# Patient Record
Sex: MALE | Race: WHITE | NOT HISPANIC OR LATINO | Employment: FULL TIME | URBAN - METROPOLITAN AREA
[De-identification: names, ages, dates, MRNs, and addresses within clinical notes are randomized per-mention and may not be internally consistent; named-entity substitution may affect disease eponyms.]

---

## 2024-04-11 ENCOUNTER — HOSPITAL ENCOUNTER (EMERGENCY)
Facility: CLINIC | Age: 44
Discharge: HOME | End: 2024-04-12
Attending: EMERGENCY MEDICINE | Admitting: EMERGENCY MEDICINE
Payer: COMMERCIAL

## 2024-04-11 ENCOUNTER — APPOINTMENT (OUTPATIENT)
Dept: CT IMAGING | Facility: CLINIC | Age: 44
End: 2024-04-11
Attending: EMERGENCY MEDICINE
Payer: COMMERCIAL

## 2024-04-11 VITALS
TEMPERATURE: 98.2 F | BODY MASS INDEX: 21.97 KG/M2 | DIASTOLIC BLOOD PRESSURE: 87 MMHG | SYSTOLIC BLOOD PRESSURE: 140 MMHG | WEIGHT: 140 LBS | RESPIRATION RATE: 16 BRPM | OXYGEN SATURATION: 95 % | HEART RATE: 77 BPM | HEIGHT: 67 IN

## 2024-04-11 DIAGNOSIS — M54.2 NECK PAIN ON LEFT SIDE: ICD-10-CM

## 2024-04-11 DIAGNOSIS — R93.89 ABNORMAL CT SCAN: ICD-10-CM

## 2024-04-11 LAB
ANION GAP SERPL CALCULATED.3IONS-SCNC: 8 MMOL/L (ref 7–15)
BASOPHILS # BLD AUTO: 0.1 10E3/UL (ref 0–0.2)
BASOPHILS NFR BLD AUTO: 1 %
BUN SERPL-MCNC: 17 MG/DL (ref 6–20)
CALCIUM SERPL-MCNC: 9.9 MG/DL (ref 8.6–10)
CHLORIDE SERPL-SCNC: 105 MMOL/L (ref 98–107)
CREAT SERPL-MCNC: 0.73 MG/DL (ref 0.67–1.17)
CRP SERPL-MCNC: 6.38 MG/L
DEPRECATED HCO3 PLAS-SCNC: 27 MMOL/L (ref 22–29)
EGFRCR SERPLBLD CKD-EPI 2021: >90 ML/MIN/1.73M2
EOSINOPHIL # BLD AUTO: 0.4 10E3/UL (ref 0–0.7)
EOSINOPHIL NFR BLD AUTO: 4 %
ERYTHROCYTE [DISTWIDTH] IN BLOOD BY AUTOMATED COUNT: 13.5 % (ref 10–15)
GLUCOSE SERPL-MCNC: 98 MG/DL (ref 70–99)
HCT VFR BLD AUTO: 42.2 % (ref 40–53)
HGB BLD-MCNC: 14.5 G/DL (ref 13.3–17.7)
IMM GRANULOCYTES # BLD: 0.1 10E3/UL
IMM GRANULOCYTES NFR BLD: 1 %
LYMPHOCYTES # BLD AUTO: 2.6 10E3/UL (ref 0.8–5.3)
LYMPHOCYTES NFR BLD AUTO: 31 %
MCH RBC QN AUTO: 30.4 PG (ref 26.5–33)
MCHC RBC AUTO-ENTMCNC: 34.4 G/DL (ref 31.5–36.5)
MCV RBC AUTO: 89 FL (ref 78–100)
MONOCYTES # BLD AUTO: 0.9 10E3/UL (ref 0–1.3)
MONOCYTES NFR BLD AUTO: 10 %
NEUTROPHILS # BLD AUTO: 4.5 10E3/UL (ref 1.6–8.3)
NEUTROPHILS NFR BLD AUTO: 53 %
NRBC # BLD AUTO: 0 10E3/UL
NRBC BLD AUTO-RTO: 0 /100
PLATELET # BLD AUTO: 273 10E3/UL (ref 150–450)
POTASSIUM SERPL-SCNC: 3.9 MMOL/L (ref 3.4–5.3)
RBC # BLD AUTO: 4.77 10E6/UL (ref 4.4–5.9)
SODIUM SERPL-SCNC: 140 MMOL/L (ref 135–145)
WBC # BLD AUTO: 8.4 10E3/UL (ref 4–11)

## 2024-04-11 PROCEDURE — 86140 C-REACTIVE PROTEIN: CPT | Performed by: EMERGENCY MEDICINE

## 2024-04-11 PROCEDURE — 250N000009 HC RX 250: Performed by: EMERGENCY MEDICINE

## 2024-04-11 PROCEDURE — 70491 CT SOFT TISSUE NECK W/DYE: CPT

## 2024-04-11 PROCEDURE — 250N000011 HC RX IP 250 OP 636: Performed by: EMERGENCY MEDICINE

## 2024-04-11 PROCEDURE — 99284 EMERGENCY DEPT VISIT MOD MDM: CPT | Performed by: EMERGENCY MEDICINE

## 2024-04-11 PROCEDURE — 85049 AUTOMATED PLATELET COUNT: CPT | Performed by: EMERGENCY MEDICINE

## 2024-04-11 PROCEDURE — 96374 THER/PROPH/DIAG INJ IV PUSH: CPT | Mod: 59 | Performed by: EMERGENCY MEDICINE

## 2024-04-11 PROCEDURE — 36415 COLL VENOUS BLD VENIPUNCTURE: CPT | Performed by: EMERGENCY MEDICINE

## 2024-04-11 PROCEDURE — 82374 ASSAY BLOOD CARBON DIOXIDE: CPT | Performed by: EMERGENCY MEDICINE

## 2024-04-11 PROCEDURE — 99285 EMERGENCY DEPT VISIT HI MDM: CPT | Mod: 25 | Performed by: EMERGENCY MEDICINE

## 2024-04-11 RX ORDER — IOPAMIDOL 755 MG/ML
90 INJECTION, SOLUTION INTRAVASCULAR ONCE
Status: COMPLETED | OUTPATIENT
Start: 2024-04-11 | End: 2024-04-11

## 2024-04-11 RX ORDER — DEXAMETHASONE 4 MG/1
6 TABLET ORAL 2 TIMES DAILY WITH MEALS
Qty: 9 TABLET | Refills: 0 | Status: SHIPPED | OUTPATIENT
Start: 2024-04-11 | End: 2024-04-14

## 2024-04-11 RX ORDER — KETOROLAC TROMETHAMINE 15 MG/ML
15 INJECTION, SOLUTION INTRAMUSCULAR; INTRAVENOUS ONCE
Status: COMPLETED | OUTPATIENT
Start: 2024-04-11 | End: 2024-04-11

## 2024-04-11 RX ADMIN — IOPAMIDOL 90 ML: 755 INJECTION, SOLUTION INTRAVENOUS at 10:44

## 2024-04-11 RX ADMIN — SODIUM CHLORIDE 80 ML: 9 INJECTION, SOLUTION INTRAVENOUS at 10:44

## 2024-04-11 RX ADMIN — KETOROLAC TROMETHAMINE 15 MG: 15 INJECTION, SOLUTION INTRAMUSCULAR; INTRAVENOUS at 10:40

## 2024-04-11 ASSESSMENT — ACTIVITIES OF DAILY LIVING (ADL)
ADLS_ACUITY_SCORE: 35

## 2024-04-11 ASSESSMENT — COLUMBIA-SUICIDE SEVERITY RATING SCALE - C-SSRS
2. HAVE YOU ACTUALLY HAD ANY THOUGHTS OF KILLING YOURSELF IN THE PAST MONTH?: NO
6. HAVE YOU EVER DONE ANYTHING, STARTED TO DO ANYTHING, OR PREPARED TO DO ANYTHING TO END YOUR LIFE?: NO
1. IN THE PAST MONTH, HAVE YOU WISHED YOU WERE DEAD OR WISHED YOU COULD GO TO SLEEP AND NOT WAKE UP?: NO

## 2024-04-11 NOTE — ED TRIAGE NOTES
#19 Root canal on Monday, pt has swelling in left jaw, went to dentist today and they stated he needed to come to ED for IV antibiotics.      Triage Assessment (Adult)       Row Name 04/11/24 1014          Triage Assessment    Airway WDL WDL        Respiratory WDL    Respiratory WDL WDL        Skin Circulation/Temperature WDL    Skin Circulation/Temperature WDL WDL        Cardiac WDL    Cardiac WDL WDL        Peripheral/Neurovascular WDL    Peripheral Neurovascular WDL WDL

## 2024-04-11 NOTE — DISCHARGE INSTRUCTIONS
CT scan of your neck did not show any abscess or signs of infection. I sent a prescription for a steroid to help with your swelling. No signs of infection at this point. Will give you antibiotics in case this is a developing infection and I would like you to follow up with your dentist. There was an abnormality in the right side of your brain which were only partially visualized on CT scan. You will need a MRI of your brain to look at this further. I understand that you can not wait here today to have this done and an order for outpatient test was ordered. You will need to call [phone] to schedule. You will not be contacted with results. You will need to follow up on results thru MyChart or with your primary care provider.     If you develop fever greater than 100.4, trouble swallowing, trouble opening your mouth, drooling, uncontrollable pain, or other new symptoms that you find concerning, you should return to the ED right away.

## 2024-04-11 NOTE — ED PROVIDER NOTES
"  History     Chief Complaint   Patient presents with    Facial Swelling     Root canal on Monday, pt has swelling in left jaw, went to dentist today and they stated he needed to come to ED for IV antibiotics.      HPI  Jose Dugan is a 43 year old male who presents with pain and swelling under left side of jaw and dysphagia in context of root canal and tooth 19 3 days previous.  He says he followed up with his dentist today and was told that he should come to the emergency department and will likely need IV antibiotics.  He states that he cannot open his mouth as far as he typically does and has some discomfort with swallowing.  He has no fevers.  No swelling around tooth itself.  Tooth was initially sensitive but that seems to have improved.  Not on any prescription medications.  Does smoke cigarettes.    The patient's PMHx, Surgical Hx, Allergies, and Medications were all reviewed with the patient.    Allergies:  Allergies   Allergen Reactions    Azithromycin GI Disturbance and Nausea and Vomiting    Ceclor [Cefaclor]        Problem List:    There are no problems to display for this patient.       Past Medical History:    No past medical history on file.    Past Surgical History:    No past surgical history on file.    Family History:    No family history on file.    Social History:  Marital Status:  Single [1]        Medications:    amoxicillin-clavulanate (AUGMENTIN) 875-125 MG tablet  dexAMETHasone (DECADRON) 4 MG tablet          Review of Systems  Pertinent positives and negatives mentioned in HPI    Physical Exam   BP: (!) 140/87  Pulse: 77  Temp: 98.2  F (36.8  C)  Resp: 16  Height: 170.2 cm (5' 7\")  Weight: 63.5 kg (140 lb)  SpO2: 95 %    GEN: Awake, alert, and cooperative.   HENT: No tenderness of tooth 19 or 20 with tapping with tongue blade.  No fluctuance of gingiva.  No fullness or tenderness sublingually.  Edema and significant tenderness medial to left angle of jaw.  No trismus present able " to open mouth to 3 finger widths.  No hoarseness of voice.  Tolerating oral secretions well  NECK: Symmetric, freely mobile.  See HEENT above  CV : Extremities warm and well perfused.  PULM: Normal effort. Speaking in full sentences.  NEURO: Normal speech. Following commands.  Answering questions and interacting appropriately.   EXT: No gross deformity.   INT: Warm. No diaphoresis. Normal color.     ED Course        Procedures                 Critical Care time:  none               Results for orders placed or performed during the hospital encounter of 04/11/24 (from the past 24 hour(s))   CBC with platelets differential    Narrative    The following orders were created for panel order CBC with platelets differential.  Procedure                               Abnormality         Status                     ---------                               -----------         ------                     CBC with platelets and d...[[id number]]                      Final result                 Please view results for these tests on the individual orders.   Basic metabolic panel   Result Value Ref Range    Sodium 140 135 - 145 mmol/L    Potassium 3.9 3.4 - 5.3 mmol/L    Chloride 105 98 - 107 mmol/L    Carbon Dioxide (CO2) 27 22 - 29 mmol/L    Anion Gap 8 7 - 15 mmol/L    Urea Nitrogen 17.0 6.0 - 20.0 mg/dL    Creatinine 0.73 0.67 - 1.17 mg/dL    GFR Estimate >90 >60 mL/min/1.73m2    Calcium 9.9 8.6 - 10.0 mg/dL    Glucose 98 70 - 99 mg/dL   CRP inflammation   Result Value Ref Range    CRP Inflammation 6.38 (H) <5.00 mg/L   CBC with platelets and differential   Result Value Ref Range    WBC Count 8.4 4.0 - 11.0 10e3/uL    RBC Count 4.77 4.40 - 5.90 10e6/uL    Hemoglobin 14.5 13.3 - 17.7 g/dL    Hematocrit 42.2 40.0 - 53.0 %    MCV 89 78 - 100 fL    MCH 30.4 26.5 - 33.0 pg    MCHC 34.4 31.5 - 36.5 g/dL    RDW 13.5 10.0 - 15.0 %    Platelet Count 273 150 - 450 10e3/uL    % Neutrophils 53 %    % Lymphocytes 31 %    % Monocytes 10 %    %  Eosinophils 4 %    % Basophils 1 %    % Immature Granulocytes 1 %    NRBCs per 100 WBC 0 <1 /100    Absolute Neutrophils 4.5 1.6 - 8.3 10e3/uL    Absolute Lymphocytes 2.6 0.8 - 5.3 10e3/uL    Absolute Monocytes 0.9 0.0 - 1.3 10e3/uL    Absolute Eosinophils 0.4 0.0 - 0.7 10e3/uL    Absolute Basophils 0.1 0.0 - 0.2 10e3/uL    Absolute Immature Granulocytes 0.1 <=0.4 10e3/uL    Absolute NRBCs 0.0 10e3/uL   Soft tissue neck CT w contrast    Narrative    CT OF THE NECK WITH CONTRAST April 11, 2024 10:54 AM     HISTORY: Root canal three day ago (tooth 19) now with soft tissue  swelling and tenderness under jaw on left. No trismus. Dysphagia  present.    TECHNIQUE: Axial CT images of the neck were acquired after the  intravenous administration of 90 mL Isovue-370 nonionic iodinated  contrast material. Coronal reconstructions were created. Radiation  dose for this scan was reduced using automated exposure control,  adjustment of the mA and/or kV according to patient size, or iterative  reconstruction technique.    COMPARISON: Head CT 7/24/2016.    FINDINGS: There is a new area of abnormal hypodensity at the  anterolateral aspect of the right frontal bone that is partially  visualized on the intracranial images. Brain MRI with contrast  recommended for further characterization.    There are root canal changes for tooth #19 consistent with the  patient's recent history of root canal. There is no evidence for  significant inflammatory change adjacent to the left side of the  mandible related to the recent root canal. Specifically, there is no  evidence for fluid collection or abscess related to the maxilla or  mandible on either side.    There are scattered mildly prominent anterior and posterior cervical  chain lymph nodes in both sides of the neck that are nonspecific.  There is no cervical lymphadenopathy.    Thyroid gland is unremarkable. The salivary glands are unremarkable.  No sinusitis or mastoiditis. The lung apices  are clear.      Impression    IMPRESSION:  1. Interval development of an area of hypodensity in the anterolateral  aspect of the right frontal lobe that is only partially visualized on  this study. This is of uncertain etiology. Brain MRI with contrast  recommended for better characterization.  2. Relative canal changes of tooth #19 consistent with the patient's  recent history. No evidence for dental origin inflammation or  infection.  3. Otherwise, normal soft tissue neck CT.      RICKEY IVY MD         SYSTEM ID:  [id number]       Medications   ketorolac (TORADOL) injection 15 mg (15 mg Intravenous $Given 4/11/24 1040)   iopamidol (ISOVUE-370) solution 90 mL (90 mLs Intravenous $Given 4/11/24 1044)   sodium chloride 0.9 % bag 500mL for CT scan flush use (80 mLs Intravenous $Given 4/11/24 1044)       Assessments & Plan (with Medical Decision Making)   43 year old male with progressive swelling under left angle of jaw in context of root canal on tooth #19 3 days ago.  Was advised by dentist to come to emergency department and likely need for parental antibiotics.  On exam he does have soft tissue swelling under left angle of jaw with significant tenderness to palpation.  I do not see any lesions within his mouth and no fluctuance or tenderness of gingiva.  Tooth is not tender to tapping with tongue blade.  No edema posterior oropharynx.  No edema sublingually.  Plan for CBC, BMP, CRP, CT soft tissue neck, and 15 mg IV ketorolac for pain.    On reevaluation pain added and proved.  Labs reassuring and no leukocytosis.  CBC was normal.  BMP normal.  CRP mildly elevated 6.38 which is very nonspecific.  Did not see any signs of fat stranding or fluid collection on CT.  Radiologist noted relative changes to tooth 19 consistent with recent root canal but no evidence for dental origin inflammation or infection.  There was however an interval development of hypodensity in anterolateral aspect of right frontal lobe that is  only partially visualized and recommend MRI of brain with contrast to better characterize.  I discussed this finding with patient.  Offered to do MRI here today.  He says that he has appointments that he cannot miss and will not be able to stay for this.  I did do a neurologic exam from head to toe and it was very reassuring and nonfocal.  I did place an order for an outpatient MRI and had a detailed discussion with him on his need to follow-up on results on his own.  There is no mechanism in place to follow-up on outpatient testing results.  Can do through MyChart or through his primary provider.    Decadron and Augmentin sent to preferred pharmacy. Could be early infection and will empirically start treatment. Instructed to follow up with Dentist. Ed return precautions discussed.              I have reviewed the nursing notes.         New Prescriptions    AMOXICILLIN-CLAVULANATE (AUGMENTIN) 875-125 MG TABLET    Take 1 tablet by mouth 2 times daily for 7 days    DEXAMETHASONE (DECADRON) 4 MG TABLET    Take 1.5 tablets (6 mg) by mouth 2 times daily (with meals) for 3 days       Final diagnoses:   Abnormal CT scan   Neck pain on left side     Edmond Valente MD        4/11/2024   [facility] EMERGENCY DEPT    Disclaimer: This note consists of words and symbols derived from keyboarding and dictation using voice recognition software.  As a result, there may be errors that have gone undetected.  Please consider this when interpreting information found in this note.               Edmond Valente MD  04/11/24 4957

## 2024-04-12 ASSESSMENT — ACTIVITIES OF DAILY LIVING (ADL): ADLS_ACUITY_SCORE: 35
